# Patient Record
Sex: FEMALE | Race: BLACK OR AFRICAN AMERICAN | NOT HISPANIC OR LATINO | Employment: UNEMPLOYED | URBAN - METROPOLITAN AREA
[De-identification: names, ages, dates, MRNs, and addresses within clinical notes are randomized per-mention and may not be internally consistent; named-entity substitution may affect disease eponyms.]

---

## 2024-11-24 ENCOUNTER — HOSPITAL ENCOUNTER (EMERGENCY)
Facility: HOSPITAL | Age: 19
Discharge: HOME | End: 2024-11-24
Attending: EMERGENCY MEDICINE

## 2024-11-24 VITALS
BODY MASS INDEX: 23.04 KG/M2 | SYSTOLIC BLOOD PRESSURE: 107 MMHG | HEIGHT: 63 IN | OXYGEN SATURATION: 97 % | HEART RATE: 97 BPM | RESPIRATION RATE: 19 BRPM | TEMPERATURE: 98 F | DIASTOLIC BLOOD PRESSURE: 69 MMHG | WEIGHT: 130 LBS

## 2024-11-24 DIAGNOSIS — J02.9 PHARYNGITIS, UNSPECIFIED ETIOLOGY: Primary | ICD-10-CM

## 2024-11-24 DIAGNOSIS — N92.6 MENSTRUAL PERIOD LATE: ICD-10-CM

## 2024-11-24 LAB
B-HCG UR QL: NEGATIVE
CTP QC/QA: YES
GROUP A STREP, MOLECULAR: NEGATIVE
INFLUENZA A, MOLECULAR: NEGATIVE
INFLUENZA B, MOLECULAR: NEGATIVE
SARS-COV-2 RDRP RESP QL NAA+PROBE: NEGATIVE
SPECIMEN SOURCE: NORMAL

## 2024-11-24 PROCEDURE — 99282 EMERGENCY DEPT VISIT SF MDM: CPT | Mod: ER

## 2024-11-24 PROCEDURE — 87635 SARS-COV-2 COVID-19 AMP PRB: CPT | Mod: ER | Performed by: EMERGENCY MEDICINE

## 2024-11-24 PROCEDURE — 81025 URINE PREGNANCY TEST: CPT | Mod: ER | Performed by: EMERGENCY MEDICINE

## 2024-11-24 PROCEDURE — 87502 INFLUENZA DNA AMP PROBE: CPT | Mod: ER | Performed by: EMERGENCY MEDICINE

## 2024-11-24 PROCEDURE — 87651 STREP A DNA AMP PROBE: CPT | Mod: ER | Performed by: EMERGENCY MEDICINE

## 2024-11-24 RX ORDER — TRIAMCINOLONE ACETONIDE 1 MG/G
CREAM TOPICAL 2 TIMES DAILY
COMMUNITY

## 2024-11-24 NOTE — ED PROVIDER NOTES
"History       Chief Complaint   Patient presents with    Dizziness     Pt C/O dizziness, ABD cramping, sore throat, chills X 4 days.  Pt also reports her period is "late" with spotting today.        HPI    Vianney Mclaughlin 19 y.o. presents to the emergency department today with a complaint of sore throat, congestion, chills that all started 4 days ago. They are able to eat and drink although it causes pain.  No difficulty tolerating their own secretions.  No change in voice.  Denies any excessive fatigue, nasal congestion, cough, shortness of breath, nausea, or vomiting. + sick contacts.  Her period is also late.       ROS  Otherwise remaining ROS negative     The history is provided by the patient    I independently reviewed:   ALLERGIES REVIEWED  MEDICATIONS REVIEWED  PMH/PSH/SOC/FH REVIEWED     History reviewed. No pertinent past medical history.  History reviewed. No pertinent surgical history.  No family history on file.  Social History     Tobacco Use    Smoking status: Never            Physical Exam   /69 (BP Location: Left arm)   Pulse 97   Temp 98.2 °F (36.8 °C) (Oral)   Resp 19   Ht 5' 3" (1.6 m)   Wt 59 kg (130 lb)   LMP 10/18/2024 (Exact Date)   SpO2 97%   Breastfeeding No   BMI 23.03 kg/m²     Nursing/Ancillary staff note reviewed.  VS reviewed    Physical Exam    General Appearance: The patient is alert, has no immediate need for airway protection and no signs of toxicity. No acute distress. Lying in bed but able to sit up without difficulty.  No muffled voice or hot potato voice.  HEENT: Eyes: Pupils equal and round no pallor or injection. Extra ocular movements intact. No drainage.       Mouth: Mucous membranes are moist. Oropharynx clear.  There is  erythema noted in the posterior pharynx.  No exudates.  Tonsils are equal, no edema. No sign of peritonsillar or tonsillar abscess.  The retropharyngeal space is normal. No sign of abscess.  Uvula midline with no edema.  Neck: Neck is " supple non-tender. Positive cervical adenopathy.  No stridor.   Respiratory: There are no retractions, lungs are clear to auscultation. No wheezing, no crackles. Chest wall nontender to palpation.   Cardiovascular: Regular rate and rhythm. No murmurs, rubs or gallops.  Gastrointestinal:  Abdomen is soft and non-tender, no masses, bowel sounds normal. No guarding, no rebound.  No pulsatile mass.   Neurological: Alert and oriented x 4. CN II-XII grossly intact. No focal weakness. Strength intact 5/5 bilaterally in upper and lower extremities.   Skin: Warm and dry, no rashes.  Musculoskeletal:Musculoskeletal: Extremities are non-tender, non-swollen and have full range of motion. Back NTTP along the midline.       Labs Reviewed   INFLUENZA A & B BY MOLECULAR       Result Value    Influenza A, Molecular Negative      Influenza B, Molecular Negative      Flu A & B Source Nasal swab     GROUP A STREP, MOLECULAR    Group A Strep, Molecular Negative     SARS-COV-2 RNA AMPLIFICATION, QUAL    SARS-CoV-2 RNA, Amplification, Qual Negative     POCT URINE PREGNANCY    POC Preg Test, Ur Negative       Acceptable Yes             ED Course     ED Course as of 11/24/24 1617   Sun Nov 24, 2024   1152 hCG Qualitative, Urine: Negative [JA]   1202 Group A Strep, Molecular: Negative [JA]   1220 Influenza A, Molecular: Negative [JA]   1223 Influenza B, Molecular: Negative [JA]   1223 SARS-CoV-2 RNA, Amplification, Qual: Negative [JA]      ED Course User Index  [JA] Uzair Alexander MD           Medical Decision Making  Problems Addressed:  Menstrual period late: complicated acute illness or injury  Pharyngitis, unspecified etiology: complicated acute illness or injury    Amount and/or Complexity of Data Reviewed  Labs: ordered. Decision-making details documented in ED Course.        Over-the-counter products such as Tylenol and ibuprofen discussed for symptom control.  Also discussed symptom control such as honey, warm  fluids, salt water gargles, Chloraseptic spray/Sucrets.    DIFFERENTIAL DIAGNOSIS: After history and physical exam a differential diagnosis was considered, but was not limited to, pharyngitis, strep throat, tonsillitis, abscess, otitis, sinusitis, URI       MDM continued  Vianney Mclaughlin 19 y.o. who History reviewed. No pertinent past medical history. who presents to the ED today with sore throat.  Workup today is reassuring.  Strep test, covid, flu negative.   Does not seem to be consistent with peritonsillar abscess, retropharyngeal abscess but of course could progress to this and Harshalyandel PIERRE Mclaughlin was counseled as to this and to what to watch for and when to return.  Does not seem to be meningitis, encephalitis, sepsis.  Seems to be consistent with viral pharyngitis.  No need for antibiotics at this time.  UPT neg.  Oxygenation appropriate on room air.  No need for admission at this time.  They are stable and appropriate for discharge home. We have discussed symptom control and will have them follow up with PCP.  The pt is comfortable with this plan and comfortable going home at this time.       After taking into careful account the historical factors and physical exam findings of the patient's presentation today, in conjunction with the empirical and objective data obtained on ED workup, no acute emergent medical condition requiring admission has been identified. The patient appears to be low risk for an emergent medical condition and I feel it is safe and appropriate at this time for the patient to be discharged to follow-up as detailed in their discharge instructions for reevaluation and possible continued outpatient workup and management. Regardless, an unremarkable evaluation in the ED does not preclude the development or presence of a serious or life threatening condition. As such, patient was instructed to return immediately for any worsening or change in current symptoms. Precautions for return  discussed at length.  Discharge and follow-up instructions discussed with the patient  who expressed understanding and willingness to comply with my recommendations.    Voice recognition software utilized in this note.         Impression        The primary encounter diagnosis was Pharyngitis, unspecified etiology. A diagnosis of Menstrual period late was also pertinent to this visit.           Medication List        ASK your doctor about these medications      triamcinolone acetonide 0.1% 0.1 % cream  Commonly known as: KENALOG               Follow-up Information       Your PCP. Schedule an appointment as soon as possible for a visit in 3 days.                                     Uzair Alexander MD  11/24/24 1287

## 2024-11-24 NOTE — Clinical Note
"Vianney Bryanwarren Mclaughlin was seen and treated in our emergency department on 11/24/2024.  She may return to work on 11/24/2024.       If you have any questions or concerns, please don't hesitate to call.      Uzair Alexander MD"

## 2024-11-24 NOTE — Clinical Note
"Vianney Mclaughlin (Shyri) was seen and treated in our emergency department on 11/24/2024.  She may return to work on 11/25/2024.       If you have any questions or concerns, please don't hesitate to call.      Cheryl Delgado RN    "